# Patient Record
Sex: FEMALE | Race: WHITE
[De-identification: names, ages, dates, MRNs, and addresses within clinical notes are randomized per-mention and may not be internally consistent; named-entity substitution may affect disease eponyms.]

---

## 2018-12-05 ENCOUNTER — HOSPITAL ENCOUNTER (EMERGENCY)
Dept: HOSPITAL 80 - FED | Age: 23
Discharge: HOME | End: 2018-12-05
Payer: COMMERCIAL

## 2018-12-05 VITALS — DIASTOLIC BLOOD PRESSURE: 73 MMHG | SYSTOLIC BLOOD PRESSURE: 119 MMHG

## 2018-12-05 DIAGNOSIS — E86.9: ICD-10-CM

## 2018-12-05 DIAGNOSIS — Z88.0: ICD-10-CM

## 2018-12-05 DIAGNOSIS — R10.33: Primary | ICD-10-CM

## 2018-12-05 LAB — PLATELET # BLD: 289 10^3/UL (ref 150–400)

## 2018-12-05 NOTE — EDPHY
H & P


Stated Complaint: periumbilical pain x ~6hours


Time Seen by Provider: 12/05/18 18:02


HPI/ROS: 





CHIEF COMPLAINT:  Periumbilical pain





HISTORY OF PRESENT ILLNESS:  The patient is a 23-year-old female who comes to 

the emergency department complaining of pain around her umbilicus for the last 

6 hr.  It is focused and exquisite.  It hurts with stretching or movement.  She 

denies any kind of injury or trauma.  No fever.  No vomiting or diarrhea.  She 

has never had pain like this before.  No previous surgical history.  No urinary 

symptoms.  No vaginal bleeding or discharge.  She denies risk of pregnancy.  

She is sexually active but is on birth control.


Severity:  Severe


Modifying factors:  None





REVIEW OF SYSTEMS:


Constitutional:  denies: chills, fever, recent illness, recent injury


EENTM: denies: blurred vision, double vision, nose congestion


Respiratory: denies: cough, shortness of breath


Cardiac: denies: chest pain, irregular heart rate, lightheadedness, palpitations


Gastrointestinal/Abdominal:  See HPI


Genitourinary: denies: dysuria, frequency, hematuria, pain


Musculoskeletal: denies: joint pain, muscle pain


Skin: denies: lesions, rash, jaundice, bruising


Neurological: denies: headache, numbness, paresthesia, tingling, dizziness, 

weakness


Hematologic/Lymphatic: denies: blood clots, easy bleeding, easy bruising


Immunologic/allergic: denies: HIV/AIDS, transplant


 10 systems reviewed and negative except as noted





EXAM:


GENERAL:  Well-appearing, well-nourished and in no acute distress.


HEAD:  Atraumatic, normocephalic.


EYES:  Pupils equal round and reactive to light, extraocular movements intact, 

sclera anicteric, conjunctiva are normal.


ENT:  TMs normal, nares patent, oropharynx clear without exudates.  Moist 

mucous membranes.


NECK:  Normal range of motion, supple without lymphadenopathy or JVD.


LUNGS:  Breath sounds clear to auscultation bilaterally and equal.  No wheezes 

rales or rhonchi.


HEART:  Regular rate and rhythm without murmurs, rubs or gallops.


ABDOMEN:  Exquisite focused periumbilical pain, no palpable hernia, normoactive 

bowel sounds.  no rebound.  No masses appreciated. 


BACK:  No CVA tenderness, no spinal tenderness, step-offs or deformities


EXTREMITIES:  Normal range of motion, no pitting or edema.  No clubbing or 

cyanosis.


NEUROLOGICAL:  Cranial nerves II through XII grossly intact.  Normal speech, 

normal gait.  5/5 strength, normal movement in all extremities, normal sensation

, normal reflexes


PSYCH:  Normal mood, normal affect.


SKIN:  Warm, dry, normal turgor, no visible rashes or lesions.








Source: Patient


Exam Limitations: No limitations





- Personal History


LMP (Females 10-55): 15-21 Days Ago





- Medical/Surgical History


Hx Asthma: No


Hx Chronic Respiratory Disease: No


Hx Diabetes: No


Hx Cardiac Disease: No


Hx Renal Disease: No


Hx Cirrhosis: No


Hx Alcoholism: No


Hx HIV/AIDS: No


Hx Splenectomy or Spleen Trauma: No


Other PMH: denies





- Family History


Significant Family History: No pertinent family hx





- Social History


Smoking Status: Never smoked


Alcohol Use: Sober


Drug Use: None


Constitutional: 


 Initial Vital Signs











Temperature (C)  36.7 C   12/05/18 17:57


 


Heart Rate  72   12/05/18 17:57


 


Respiratory Rate  18   12/05/18 17:57


 


Blood Pressure  108/78   12/05/18 17:57


 


O2 Sat (%)  97   12/05/18 17:57








 











O2 Delivery Mode               Room Air














Allergies/Adverse Reactions: 


 





amoxicillin Allergy (Verified 12/05/18 17:56)


 


clavulanic acid [From Augmentin] Allergy (Verified 12/05/18 17:57)


 








Home Medications: 














 Medication  Instructions  Recorded


 


Ortho-Novum 1-35-28 Tablet  12/05/18














Medical Decision Making





- Diagnostics


Imaging Results: 


 Imaging Impressions





Abdomen CT  12/05/18 18:07


Impression: 


1. Normal appendix


2. Constipation.


3. Small amount of free fluid in the cul-de-sac of undetermined etiology.


 


 Results discussed with Dr. John Paul Kelly at 7:35 PM.


 


General information for patients regarding this examination can be found at 

[email or web address].


 


If you have questions or comments about this report, please contact me at [phone] (hospital) or [phone] (cell). 


 











Imaging: Discussed imaging studies w/ On call Radiologist


ED Course/Re-evaluation: 





8:00 p.m. the patient's abdominal pain is resolved.  His abdominal exam is 

benign.  We discussed her CT and lab results which are reassuring.  She has 

developed some nausea after the pain medication and is now receiving Zofran.  

Will plan to discharge her at this point but warned her strictly to return if 

her symptoms worsen or she develops a fever etc.  She again denied vaginal 

bleeding or discharge and does not wish to have a pelvic exam. 


Differential Diagnosis: 





Partial list of the Differential diagnosis considered include but were not 

limited to;  ovarian cyst, hernia, appendicitis and although unlikely based on 

the history and physical exam, I also considered urinary tract infection, 

kidney stone PID, pregnancy, cellulitis.  I discussed these differential 

diagnoses and the plan with the patient as well as the usual and expected 

course.  The patient understands that the diagnosis is provisional and that in 

medicine we are not always correct and that further workup is often warranted.  

Usual and customary warnings were given.  All of the patient's questions were 

answered.  The patient was instructed to return to the emergency department 

should the symptoms at all worsen or return, otherwise to followup with the 

physician as we discussed.





- Data Points


Laboratory Results: 


 Laboratory Results





 12/05/18 18:15 





 12/05/18 18:15 








Medications Given: 


 








Discontinued Medications





Hydromorphone HCl (Dilaudid)  0.5 mg IVP EDNOW ONE


   Stop: 12/05/18 18:08


   Last Admin: 12/05/18 18:24 Dose:  0.5 mg


Sodium Chloride (Ns)  1,000 mls @ 0 mls/hr IV EDNOW ONE; Wide Open


   PRN Reason: Protocol


   Stop: 12/05/18 18:08


   Last Admin: 12/05/18 18:24 Dose:  1,000 mls


Ondansetron HCl (Zofran)  4 mg IVP EDNOW ONE


   Stop: 12/05/18 19:54


   Last Admin: 12/05/18 20:01 Dose:  4 mg


Promethazine HCl (Phenergan)  25 mg IVP EDNOW ONE


   Stop: 12/05/18 20:28


   Last Admin: 12/05/18 20:30 Dose:  25 mg








Departure





- Departure


Disposition: Home, Routine, Self-Care


Clinical Impression: 


Abdominal pain


Qualifiers:


 Abdominal location: unspecified location Qualified Code(s): R10.9 - 

Unspecified abdominal pain





Condition: Fair


Instructions:  Acute Abdominal Pain (ED)


Referrals: 


NONE *PRIMARY CARE P,. [Primary Care Provider] - As per Instructions


[facility] [Provider Group] - 1-2 days without fail

## 2018-12-14 ENCOUNTER — HOSPITAL ENCOUNTER (EMERGENCY)
Dept: HOSPITAL 80 - FED | Age: 23
Discharge: HOME | End: 2018-12-14
Payer: COMMERCIAL

## 2018-12-14 VITALS — DIASTOLIC BLOOD PRESSURE: 62 MMHG | SYSTOLIC BLOOD PRESSURE: 96 MMHG

## 2018-12-14 DIAGNOSIS — K59.09: ICD-10-CM

## 2018-12-14 DIAGNOSIS — E86.9: ICD-10-CM

## 2018-12-14 DIAGNOSIS — N30.00: Primary | ICD-10-CM

## 2018-12-14 LAB — PLATELET # BLD: 316 10^3/UL (ref 150–400)

## 2018-12-14 NOTE — EDPHY
General


Time Seen by Provider: 12/14/18 13:12


Narrative: 





CHIEF COMPLAINT: 


Pelvic cramping pain





HISTORY OF PRESENT ILLNESS: 


Patient presents private vehicle with complaints of pelvic pain and cramping.  

She reports that she has had some pelvic pain and cramping since last 

Wednesday.  It has been constant duration but does come and go.  It is mild-to-

moderate times.  Sometimes severe.  No vaginal bleeding or discharge.  No flank 

pain.  No urinary complaints.  No chest pain or shortness of breath.  No upper 

abdominal pain.  She has evaluated here with a CT scan last week that shows 

constipation and normal appendix.  She has only had 1 bowel movement since 

then.  She went to [email or web address] today where pelvic exam was performed.

  There was reportedly some tenderness on the exam.  Pelvic swabs were obtained 

which were negative for bacterial vaginosis.  Urinalysis was reportedly 

positive for infection.  She was sent here for an ultrasound higher level of 

care.  She reports no object intercourse recently.  She reports no IV drug use.

  No other associated complaints or modifying factors.





REVIEW OF SYSTEMS:


10 systems were reviewed and negative with the exception of the elements 

mentioned in the history of present illness.





PCP:


[facility]





SPECIALISTS:


None currently





PAST MEDICAL HISTORY: 


None





PAST SURGICAL HISTORY:


No abdominal surgeries





SOCIAL HISTORY:


Nonsmoker.  Lives independently.





FAMILY HISTORY:


Noncontributory





EXAMINATION:


Vitals: Triage VS reviewed


General Appearance:  Alert, no distress


Head: normocephalic, atraumatic


Eyes:  Pupils equal and round, no conjunctival pallor or injection


ENT, Mouth:  Mucous membranes moist


Neck:  Normal inspection, supple, non-tender


Respiratory:  Lungs are clear to auscultation


Cardiovascular:  Regular rate and rhythm


Gastrointestinal:  Abdomen is soft and nondistended.  Mild tenderness in the 

suprapubic abdomen.  No tympany rigidity.  No guarding.  Bowel sounds present 

all 4 quadrants.  CVA tenderness.


.  Pelvic exam deferred as this was performed just prior to arrival


Back: non-tender, no bony abnormalities


Neurological:  A&O, nonfocal, normal gait


Skin:  Warm and dry, no rash


Extremities:  Nontender, no pedal edema


Psychiatric:  Mood and affect normal





DIFFERENTIAL DIAGNOSES:


Including but not limited to PID, torsion, ovarian cysts, ruptured ovarian cysts

, fibroids, endometriosis








MDM:


1:40 p.m.


Lower abdominal pelvic pain for the past 10 days.  She was sent over from [facility] with a pelvic exam reportedly revealed some generalized 

tenderness.  She was sent here for an ultrasound of the pelvis.  She did 

reported leukocytosis in the urine.  Wet prep was documented is negative.  

Urine pregnancy test document is negative.  She is in no acute distress with 

lower abdominal pelvic pain.  No upper abdominal pain.  I have reviewed the 

information and CT scan from her last visit here.  Her vital signs are within 

limits.  She is in no acute distress.





2:40 p.m.


Notified by radiologist.  Pelvic ultrasound is negative for any acute findings.

  No free fluid or ovarian cyst.  No torsion.  I have reviewed the CT scan from 

last week which did show a moderate amount of constipation.  I re-evaluated the 

patient discussed this.  This is highly likely a potential cause of her pain.  

We also discussed possibility of endometriosis and other non emergent 

etiologies.  I do not feel that pelvic inflammatory disease is likely given her 

lack of discharge and normal vitals and laboratory studies.  We discussed 

discharge home with MiraLax and magnesium citrate.  We discuss antibiotics for 

her or UTI.  She has received IV Rocephin here without any reaction or 

difficulty.  We discussed ED precautions for any worsening pain, fever, flank 

pain, difficulty urinating or further difficulty with bowel movement.  She will 

be referred to OB and primary care physician for further care.  She is 

comfortable this plan and discharged home stable condition.





SUPERVISION:


None





CONSULTATION:


OB referral








- Diagnostics


Imaging Results: 


 Imaging Impressions





Pelvic/Renal Ultrasound  12/14/18 13:43


Impression: Normal ultrasound pelvis. 


 


Results called to Maged Flores PA-C, at 2:40 PM.











Imaging: Discussed imaging studies w/ On call Radiologist, I viewed and 

interpreted images myself





- History


History Review: I reviewed the patient's medical records


Smoking Status: Never smoked





- Objective


Vital Signs: 


 Initial Vital Signs











Temperature (C)  98.4 F   12/14/18 11:46


 


Heart Rate  76   12/14/18 11:46


 


Respiratory Rate  18   12/14/18 11:46


 


Blood Pressure  127/86 H  12/14/18 11:46


 


O2 Sat (%)  98   12/14/18 11:46








 











O2 Delivery Mode               Room Air














Allergies/Adverse Reactions: 


 





amoxicillin Allergy (Verified 12/14/18 11:48)


 


clavulanic acid [From Augmentin] Allergy (Verified 12/14/18 11:48)


 








Home Medications: 














 Medication  Instructions  Recorded


 


Ortho-Novum 1-35-28 Tablet  12/05/18


 


Cephalexin [Keflex (*)] 500 mg PO BID #14 cap 12/14/18











Laboratory Results: 


 Laboratory Results





 12/14/18 14:10 





 12/14/18 14:10 





 











  12/14/18 12/14/18 12/14/18





  14:10 14:10 14:10


 


WBC      9.08 10^3/uL 10^3/uL





     (3.80-9.50) 


 


RBC      4.94 10^6/uL 10^6/uL





     (4.18-5.33) 


 


Hgb      14.2 g/dL g/dL





     (12.6-16.3) 


 


Hct      43.1 % %





     (38.0-47.0) 


 


MCV      87.2 fL fL





     (81.5-99.8) 


 


MCH      28.7 pg pg





     (27.9-34.1) 


 


MCHC      32.9 g/dL g/dL





     (32.4-36.7) 


 


RDW      13.1 % %





     (11.5-15.2) 


 


Plt Count      316 10^3/uL 10^3/uL





     (150-400) 


 


MPV      11.3 fL fL





     (8.7-11.7) 


 


Neut % (Auto)      57.9 % %





     (39.3-74.2) 


 


Lymph % (Auto)      32.5 % %





     (15.0-45.0) 


 


Mono % (Auto)      6.4 % %





     (4.5-13.0) 


 


Eos % (Auto)      1.9 % %





     (0.6-7.6) 


 


Baso % (Auto)      1.0 % %





     (0.3-1.7) 


 


Nucleat RBC Rel Count      0.0 % %





     (0.0-0.2) 


 


Absolute Neuts (auto)      5.26 10^3/uL 10^3/uL





     (1.70-6.50) 


 


Absolute Lymphs (auto)      2.95 10^3/uL 10^3/uL





     (1.00-3.00) 


 


Absolute Monos (auto)      0.58 10^3/uL 10^3/uL





     (0.30-0.80) 


 


Absolute Eos (auto)      0.17 10^3/uL 10^3/uL





     (0.03-0.40) 


 


Absolute Basos (auto)      0.09 10^3/uL 10^3/uL





     (0.02-0.10) 


 


Absolute Nucleated RBC      0.00 10^3/uL 10^3/uL





     (0-0.01) 


 


Immature Gran %      0.3 % %





     (0.0-1.1) 


 


Immature Gran #      0.03 10^3/uL 10^3/uL





     (0.00-0.10) 


 


Sodium    140 mEq/L mEq/L  





    (135-145)  


 


Potassium    4.6 mEq/L mEq/L  





    (3.5-5.2)  


 


Chloride    108 mEq/L mEq/L  





    ()  


 


Carbon Dioxide    21 mEq/l L mEq/l  





    (22-31)  


 


Anion Gap    11 mEq/L mEq/L  





    (6-14)  


 


BUN    12 mg/dL mg/dL  





    (7-23)  


 


Creatinine    0.7 mg/dL mg/dL  





    (0.6-1.0)  


 


Estimated GFR    > 60   





    


 


Glucose    81 mg/dL mg/dL  





    ()  


 


Calcium    9.2 mg/dL mg/dL  





    (8.5-10.4)  


 


Total Bilirubin    0.5 mg/dL mg/dL  





    (0.1-1.4)  


 


Conjugated Bilirubin    0.1 mg/dL mg/dL  





    (0.0-0.5)  


 


Unconjugated Bilirubin    0.4 mg/dL mg/dL  





    (0.0-1.1)  


 


AST    25 IU/L IU/L  





    (14-46)  


 


ALT    24 IU/L IU/L  





    (9-52)  


 


Alkaline Phosphatase    57 IU/L IU/L  





    ()  


 


Total Protein    7.4 g/dL g/dL  





    (6.3-8.2)  


 


Albumin    4.3 g/dL g/dL  





    (3.5-5.0)  


 


Lipase    80 IU/L IU/L  





    ()  


 


Beta HCG, Qual  NEGATIVE     





    


 


Urine Color      





    


 


Urine Appearance      





    


 


Urine pH      





    


 


Ur Specific Gravity      





    


 


Urine Protein      





    


 


Urine Ketones      





    


 


Urine Blood      





    


 


Urine Nitrate      





    


 


Urine Bilirubin      





    


 


Urine Urobilinogen      





    


 


Ur Leukocyte Esterase      





    


 


Urine RBC      





    


 


Urine WBC      





    


 


Ur Epithelial Cells      





    


 


Urine Bacteria      





    


 


Urine Mucus      





    


 


Urine Glucose      





    


 


C.trachomatis RNA (TMA)      





    


 


N.gonorrhoeae RNA (TMA)      





    














  12/14/18 12/14/18





  14:05 12:45


 


WBC    





   


 


RBC    





   


 


Hgb    





   


 


Hct    





   


 


MCV    





   


 


MCH    





   


 


MCHC    





   


 


RDW    





   


 


Plt Count    





   


 


MPV    





   


 


Neut % (Auto)    





   


 


Lymph % (Auto)    





   


 


Mono % (Auto)    





   


 


Eos % (Auto)    





   


 


Baso % (Auto)    





   


 


Nucleat RBC Rel Count    





   


 


Absolute Neuts (auto)    





   


 


Absolute Lymphs (auto)    





   


 


Absolute Monos (auto)    





   


 


Absolute Eos (auto)    





   


 


Absolute Basos (auto)    





   


 


Absolute Nucleated RBC    





   


 


Immature Gran %    





   


 


Immature Gran #    





   


 


Sodium    





   


 


Potassium    





   


 


Chloride    





   


 


Carbon Dioxide    





   


 


Anion Gap    





   


 


BUN    





   


 


Creatinine    





   


 


Estimated GFR    





   


 


Glucose    





   


 


Calcium    





   


 


Total Bilirubin    





   


 


Conjugated Bilirubin    





   


 


Unconjugated Bilirubin    





   


 


AST    





   


 


ALT    





   


 


Alkaline Phosphatase    





   


 


Total Protein    





   


 


Albumin    





   


 


Lipase    





   


 


Beta HCG, Qual    





   


 


Urine Color    YELLOW 





   


 


Urine Appearance    CLEAR 





   


 


Urine pH    5.0 





    (5.0-7.5) 


 


Ur Specific Gravity    1.015 





    (1.002-1.030) 


 


Urine Protein    NEGATIVE 





    (NEGATIVE) 


 


Urine Ketones    TRACE  H 





    (NEGATIVE) 


 


Urine Blood    NEGATIVE 





    (NEGATIVE) 


 


Urine Nitrate    NEGATIVE 





    (NEGATIVE) 


 


Urine Bilirubin    NEGATIVE 





    (NEGATIVE) 


 


Urine Urobilinogen    NEGATIVE EU EU





    (0.2-1.0) 


 


Ur Leukocyte Esterase    TRACE  H 





    (NEGATIVE) 


 


Urine RBC    1-3 /hpf /hpf





    (0-3) 


 


Urine WBC    25-50 /hpf H /hpf





    (0-3) 


 


Ur Epithelial Cells    TRACE /lpf /lpf





    (NONE-1+) 


 


Urine Bacteria    1+ /hpf H /hpf





    (NONE SEEN) 


 


Urine Mucus    TRACE /lpf /lpf





    (NONE-1+) 


 


Urine Glucose    NEGATIVE 





    (NEGATIVE) 


 


C.trachomatis RNA (TMA)  Pending   





   


 


N.gonorrhoeae RNA (TMA)  Pending   





   











Medications Given: 


 








Discontinued Medications





Sodium Chloride (Ns)  1,000 mls @ 0 mls/hr IV EDNOW ONE; Wide Open


   PRN Reason: Protocol


   Stop: 12/14/18 13:43


   Last Admin: 12/14/18 15:14 Dose:  1,000 mls


Ceftriaxone Sodium/Dextrose (Rocephin 1 Gm (Premix))  50 mls @ 100 mls/hr IV 

EDNOW ONE


   PRN Reason: Protocol


   Stop: 12/14/18 14:49


   Last Admin: 12/14/18 15:16 Dose:  50 mls


Ketorolac Tromethamine (Toradol)  30 mg IVP EDNOW ONE


   Stop: 12/14/18 13:43


   Last Admin: 12/14/18 15:15 Dose:  30 mg








Departure





- Departure


Disposition: Home, Routine, Self-Care


Clinical Impression: 


Constipation


Qualifiers:


 Constipation type: other constipation type Qualified Code(s): K59.09 - Other 

constipation





UTI (urinary tract infection)


Qualifiers:


 Urinary tract infection type: acute cystitis Hematuria presence: without 

hematuria Qualified Code(s): N30.00 - Acute cystitis without hematuria





Abdominal pain


Qualifiers:


 Abdominal location: lower abdomen, unspecified Qualified Code(s): R10.30 - 

Lower abdominal pain, unspecified





Condition: Good


Instructions:  Constipation (ED), Urinary Tract Infection in Women (ED), High 

Fiber Diet (ED), Fleet Enema (ED)


Additional Instructions: 


1. Keflex as prescribed to completion for UTI


2. Recommend over-the-counter magnesium citrate.  Take half the bottle and wait 

for 2-4 h. If no bowel movement you take the remainder of the bottle.


3. Recommend over-the-counter MiraLax.  One packet once daily or every other 

day to prevent further constipation


4. Recommend over-the-counter Fleet enema at home


5. Follow up with primary care physician and Ob/Gyn physician for further care


6.  ED precautions for any worsening pain, fever, difficulty with bowel movement

, difficulty urinating


Referrals: 


Kate Membreno DO [Doctor of Osteopathy] - As per Instructions


Keli Flores MD [[facility] Primary Care Provider] - As per Instructions


Prescriptions: 


Cephalexin [Keflex (*)] 500 mg PO BID #14 cap

## 2018-12-17 LAB — GC AMPLIFICATION GENPROBE: NEGATIVE
